# Patient Record
Sex: MALE | Race: BLACK OR AFRICAN AMERICAN | NOT HISPANIC OR LATINO | URBAN - METROPOLITAN AREA
[De-identification: names, ages, dates, MRNs, and addresses within clinical notes are randomized per-mention and may not be internally consistent; named-entity substitution may affect disease eponyms.]

---

## 2018-08-16 ENCOUNTER — OUTPATIENT (OUTPATIENT)
Dept: OUTPATIENT SERVICES | Facility: HOSPITAL | Age: 38
LOS: 1 days | End: 2018-08-16

## 2018-08-16 VITALS
TEMPERATURE: 98 F | HEIGHT: 70.5 IN | DIASTOLIC BLOOD PRESSURE: 84 MMHG | HEART RATE: 73 BPM | WEIGHT: 203.05 LBS | RESPIRATION RATE: 14 BRPM | SYSTOLIC BLOOD PRESSURE: 122 MMHG | OXYGEN SATURATION: 99 %

## 2018-08-16 DIAGNOSIS — N47.1 PHIMOSIS: ICD-10-CM

## 2018-08-16 DIAGNOSIS — Z98.890 OTHER SPECIFIED POSTPROCEDURAL STATES: Chronic | ICD-10-CM

## 2018-08-16 LAB
HCT VFR BLD CALC: 47 % — SIGNIFICANT CHANGE UP (ref 39–50)
HGB BLD-MCNC: 15.5 G/DL — SIGNIFICANT CHANGE UP (ref 13–17)
MCHC RBC-ENTMCNC: 29.5 PG — SIGNIFICANT CHANGE UP (ref 27–34)
MCHC RBC-ENTMCNC: 33 % — SIGNIFICANT CHANGE UP (ref 32–36)
MCV RBC AUTO: 89.5 FL — SIGNIFICANT CHANGE UP (ref 80–100)
NRBC # FLD: 0 — SIGNIFICANT CHANGE UP
PLATELET # BLD AUTO: 319 K/UL — SIGNIFICANT CHANGE UP (ref 150–400)
PMV BLD: 9.4 FL — SIGNIFICANT CHANGE UP (ref 7–13)
RBC # BLD: 5.25 M/UL — SIGNIFICANT CHANGE UP (ref 4.2–5.8)
RBC # FLD: 12.5 % — SIGNIFICANT CHANGE UP (ref 10.3–14.5)
WBC # BLD: 5.57 K/UL — SIGNIFICANT CHANGE UP (ref 3.8–10.5)
WBC # FLD AUTO: 5.57 K/UL — SIGNIFICANT CHANGE UP (ref 3.8–10.5)

## 2018-08-16 NOTE — H&P PST ADULT - RS GEN PE MLT RESP DETAILS PC
airway patent/breath sounds equal/no chest wall tenderness/no rhonchi/respirations non-labored/good air movement/no wheezes/clear to auscultation bilaterally/no intercostal retractions/no rales

## 2018-08-16 NOTE — H&P PST ADULT - NSANTHOSAYNRD_GEN_A_CORE
No. LORRI screening performed.  STOP BANG Legend: 0-2 = LOW Risk; 3-4 = INTERMEDIATE Risk; 5-8 = HIGH Risk

## 2018-08-16 NOTE — H&P PST ADULT - NEGATIVE CARDIOVASCULAR SYMPTOMS
no paroxysmal nocturnal dyspnea/no claudication/no chest pain/no orthopnea/no palpitations/no dyspnea on exertion

## 2018-08-16 NOTE — H&P PST ADULT - HISTORY OF PRESENT ILLNESS
39 y/o Black male presents with 39 y/o Black male presents to PST for pre op evaluation with pre op diagnosis phimosis in preparation for circumcision on 08/22/18

## 2018-08-16 NOTE — H&P PST ADULT - NEGATIVE GENERAL SYMPTOMS
no fever/no chills/no weight gain/no malaise/no fatigue/no sweating/no anorexia/no weight loss/no polyphagia no chills/no anorexia/no polyuria/no weight loss/no polyphagia/no fatigue/no weight gain/no sweating/no fever/no malaise

## 2018-08-16 NOTE — H&P PST ADULT - NEGATIVE BREAST SYMPTOMS
no nipple discharge R/no breast tenderness L/no breast tenderness R/no breast lump R/no nipple discharge L/no breast lump L

## 2018-08-16 NOTE — H&P PST ADULT - NEGATIVE SKIN SYMPTOMS
no itching/no brittle nails/no change in size/color of mole/no rash/no dryness/no tumor/no pitted nails/no hair loss

## 2018-08-16 NOTE — H&P PST ADULT - PAIN CHRONIC, PROFILE
Problem: Diabetes, Type 2 (Adult)  Goal: Signs and Symptoms of Listed Potential Problems Will be Absent, Minimized or Managed (Diabetes, Type 2)  Signs and symptoms of listed potential problems will be absent, minimized or managed by discharge/transition of care (reference Diabetes, Type 2 (Adult) CPG).   Outcome: Ongoing (interventions implemented as appropriate)  Bedtime CBG at 138.  No coverage noted.    Problem: Fall Risk (Adult)  Goal: Absence of Falls  Patient will demonstrate the desired outcomes by discharge/transition of care.   Outcome: Ongoing (interventions implemented as appropriate)  Patient is a fall risk due to BLE weakness.  Fall bracelet, socks and bed alarm noted in use.    Problem: Patient Care Overview  Goal: Plan of Care Review  Plan of care the same.  Patient not interested in reviewing it tonight.  No distress...just not interested.    Problem: Infection, Risk/Actual (Adult)  Goal: Infection Prevention/Resolution  Patient will demonstrate the desired outcomes by discharge/transition of care.   Outcome: Ongoing (interventions implemented as appropriate)  WBCs wnl.  No chills or temps noted.      Problem: Pressure Ulcer Risk (Johny Scale) (Adult,Obstetrics,Pediatric)  Goal: Skin Integrity  Patient will demonstrate the desired outcomes by discharge/transition of care.   Outcome: Ongoing (interventions implemented as appropriate)  No breakdown noted/observed.  BLEs with mild/trace edema but no break in skin.       no

## 2018-08-16 NOTE — H&P PST ADULT - NEGATIVE GENERAL GENITOURINARY SYMPTOMS
no hematuria/no renal colic/no flank pain R/no dysuria/no bladder infections/no urinary hesitancy/normal urinary frequency/no nocturia/no urine discoloration/no gas in urine/no incontinence/no flank pain L

## 2018-08-16 NOTE — H&P PST ADULT - PROBLEM SELECTOR PLAN 1
Scheduled for circumcision on 08/22/18. Pre op instructions, famotidine given and explained. Pt verbalized understanding.

## 2018-08-21 ENCOUNTER — TRANSCRIPTION ENCOUNTER (OUTPATIENT)
Age: 38
End: 2018-08-21

## 2018-08-22 ENCOUNTER — RESULT REVIEW (OUTPATIENT)
Age: 38
End: 2018-08-22

## 2018-08-22 ENCOUNTER — OUTPATIENT (OUTPATIENT)
Dept: OUTPATIENT SERVICES | Facility: HOSPITAL | Age: 38
LOS: 1 days | Discharge: HOME | End: 2018-08-22
Payer: COMMERCIAL

## 2018-08-22 VITALS
HEART RATE: 65 BPM | RESPIRATION RATE: 16 BRPM | SYSTOLIC BLOOD PRESSURE: 124 MMHG | WEIGHT: 203.05 LBS | TEMPERATURE: 99 F | OXYGEN SATURATION: 100 % | DIASTOLIC BLOOD PRESSURE: 76 MMHG | HEIGHT: 70.5 IN

## 2018-08-22 VITALS — OXYGEN SATURATION: 97 % | DIASTOLIC BLOOD PRESSURE: 79 MMHG | HEART RATE: 79 BPM | SYSTOLIC BLOOD PRESSURE: 122 MMHG

## 2018-08-22 DIAGNOSIS — N47.1 PHIMOSIS: ICD-10-CM

## 2018-08-22 DIAGNOSIS — Z98.890 OTHER SPECIFIED POSTPROCEDURAL STATES: Chronic | ICD-10-CM

## 2018-08-22 PROCEDURE — 88304 TISSUE EXAM BY PATHOLOGIST: CPT | Mod: 26

## 2018-08-22 NOTE — ASU DISCHARGE PLAN (ADULT/PEDIATRIC). - NOTIFY
Bleeding that does not stop/Fever greater than 101/Unable to Urinate/Pain not relieved by Medications/Persistent Nausea and Vomiting

## 2018-08-22 NOTE — ASU DISCHARGE PLAN (ADULT/PEDIATRIC). - MEDICATION SUMMARY - MEDICATIONS TO TAKE
I will START or STAY ON the medications listed below when I get home from the hospital:  None I will START or STAY ON the medications listed below when I get home from the hospital:    oxyCODONE-acetaminophen 5 mg-325 mg oral tablet  -- 1 tab(s) by mouth every 6 hours, As Needed MDD:6  -- Caution federal law prohibits the transfer of this drug to any person other  than the person for whom it was prescribed.  May cause drowsiness.  Alcohol may intensify this effect.  Use care when operating dangerous machinery.  This prescription cannot be refilled.  This product contains acetaminophen.  Do not use  with any other product containing acetaminophen to prevent possible liver damage.  Using more of this medication than prescribed may cause serious breathing problems.    -- Indication: For Pain

## 2018-08-24 LAB — SURGICAL PATHOLOGY STUDY: SIGNIFICANT CHANGE UP

## 2022-11-07 PROBLEM — N47.1 PHIMOSIS: Chronic | Status: ACTIVE | Noted: 2018-08-16

## 2022-11-08 PROBLEM — Z00.00 ENCOUNTER FOR PREVENTIVE HEALTH EXAMINATION: Status: ACTIVE | Noted: 2022-11-08

## 2022-11-09 ENCOUNTER — NON-APPOINTMENT (OUTPATIENT)
Age: 42
End: 2022-11-09

## 2022-11-09 ENCOUNTER — APPOINTMENT (OUTPATIENT)
Dept: ORTHOPEDIC SURGERY | Facility: CLINIC | Age: 42
End: 2022-11-09

## 2022-11-09 VITALS — WEIGHT: 196 LBS | HEIGHT: 71 IN | BODY MASS INDEX: 27.44 KG/M2

## 2022-11-09 DIAGNOSIS — Z78.9 OTHER SPECIFIED HEALTH STATUS: ICD-10-CM

## 2022-11-09 PROCEDURE — 99204 OFFICE O/P NEW MOD 45 MIN: CPT

## 2022-11-09 PROCEDURE — 71130 X-RAY STRENOCLAVIC JT 3/>VWS: CPT | Mod: LT

## 2022-11-09 PROCEDURE — 99072 ADDL SUPL MATRL&STAF TM PHE: CPT

## 2022-11-09 PROCEDURE — 73030 X-RAY EXAM OF SHOULDER: CPT | Mod: LT

## 2022-11-09 PROCEDURE — 72040 X-RAY EXAM NECK SPINE 2-3 VW: CPT

## 2022-11-09 NOTE — WORK
[Sprain/Strain] : sprain/strain [Was the competent medical cause of the injury] : was the competent medical cause of the injury [Are consistent with the injury] : are consistent with the injury [Consistent with my objective findings] : consistent with my objective findings [Total] : total [Does not reveal pre-existing condition(s) that may affect treatment/prognosis] : does not reveal pre-existing condition(s) that may affect treatment/prognosis [Cannot return to work because ________] : cannot return to work because [unfilled] [Unknown at this time] : : unknown at this time [No Rx restrictions] : No Rx restrictions. [I provided the services listed above] :  I provided the services listed above. [FreeTextEntry1] : good with treatment

## 2022-11-09 NOTE — HISTORY OF PRESENT ILLNESS
[Work related] : work related [9] : 9 [6] : 6 [Constant] : constant [] : yes [de-identified] : MERNA 11/4/22\par \par 42 year old RHD male with pain in the left shoulder/neck region. He was at work and was crushed between two large baggage carts, force was lateral to the shoulder and he felt immediate pain, could hardly breathe. He was take to [facility], underwent evaluation and instructed Xrays were negative, discharged home. He has been taking Ibuprofen and Robaxin without much help. Pain with motion left shoulder in the sternal region and up into the neck. No radicular complaints. No prior history of injury to the shoulder. He has not worked since, he works for [employer] [FreeTextEntry1] : left shoulder [FreeTextEntry3] : 11/422 [FreeTextEntry4] : 2:30pm [FreeTextEntry5] : Patient was moving bags and was pinned between two carts, injuring his shoulder. [FreeTextEntry6] : pulling [FreeTextEntry7] : neck, clavicle

## 2022-11-09 NOTE — DISCUSSION/SUMMARY
[de-identified] : Let this serve as a formal request for authorization for PT\par heat\par  lido patches\par HEP

## 2022-11-09 NOTE — PHYSICAL EXAM
[No bony abnormalities] : No bony abnormalities [Sitting] : sitting [Severe] : severe [Left] : left shoulder [There are no fractures, subluxations or dislocations. No significant abnormalities are seen] : There are no fractures, subluxations or dislocations. No significant abnormalities are seen [] : no prominent SC joint [FreeTextEntry3] : swelling near the AC joint, SC tenderness, no sternal tenderness. Some tenderness costochondral region. \par No crepitus, mild pain with deep breath. [TWNoteComboBox7] : active forward flexion 90 degrees [TWNoteComboBox6] : internal rotation sacrum [de-identified] : external rotation 60 degrees

## 2022-11-28 ENCOUNTER — NON-APPOINTMENT (OUTPATIENT)
Age: 42
End: 2022-11-28

## 2022-11-28 ENCOUNTER — APPOINTMENT (OUTPATIENT)
Dept: ORTHOPEDIC SURGERY | Facility: CLINIC | Age: 42
End: 2022-11-28

## 2022-11-28 VITALS — WEIGHT: 196 LBS | BODY MASS INDEX: 27.44 KG/M2 | HEIGHT: 71 IN

## 2022-11-28 PROCEDURE — 99214 OFFICE O/P EST MOD 30 MIN: CPT

## 2022-11-28 PROCEDURE — 99072 ADDL SUPL MATRL&STAF TM PHE: CPT

## 2022-11-28 NOTE — ASU DISCHARGE PLAN (ADULT/PEDIATRIC). - PATIENT BELONGING
patient's belongings returned
How Severe Is Your Cyst?: mild
Is This A New Presentation, Or A Follow-Up?: Cyst

## 2022-11-28 NOTE — REASON FOR VISIT
[FreeTextEntry2] : Patient is here to evaluate the Left Shoulder and L sided neck. Worker's Comp DOI: 11/4/22

## 2022-11-28 NOTE — WORK
[Total] : total [Does not reveal pre-existing condition(s) that may affect treatment/prognosis] : does not reveal pre-existing condition(s) that may affect treatment/prognosis [Cannot return to work because ________] : cannot return to work because [unfilled] [Unknown at this time] : : unknown at this time [No Rx restrictions] : No Rx restrictions. [I provided the services listed above] :  I provided the services listed above. [FreeTextEntry1] : good needs more pT and MRI shoulder

## 2022-11-28 NOTE — HISTORY OF PRESENT ILLNESS
[Dull/Aching] : dull/aching [Localized] : localized [Throbbing] : throbbing [Work related] : work related [9] : 9 [6] : 6 [Constant] : constant [] : yes [de-identified] : DOA 11/4/22\par \par  pain in the left shoulder/neck region after he  was crushed between two large baggage carts, force was lateral to the shoulder and he felt immediate pain, a little better in PT still with pain in certain motions, he is not working [FreeTextEntry1] : left shoulder [FreeTextEntry3] : 11/422 [FreeTextEntry4] : 2:30pm [FreeTextEntry5] : Patient was moving bags and was pinned between two carts, injuring his shoulder. [FreeTextEntry6] : pulling [FreeTextEntry7] : neck, clavicle [de-identified] : physical therapy

## 2022-11-28 NOTE — DISCUSSION/SUMMARY
[de-identified] : Let this serve as a formal request for comp authorization for PT and MRI L shoulder\par heat\par lido patches\par HEP

## 2022-11-28 NOTE — PHYSICAL EXAM
[Left] : left shoulder [Sitting] : sitting [Severe] : severe [] : no prominent SC joint [FreeTextEntry3] : swelling near the AC joint, SC tenderness [TWNoteComboBox7] : active forward flexion 135 degrees [TWNoteComboBox6] : internal rotation sacrum [de-identified] : external rotation 60 degrees

## 2022-12-01 ENCOUNTER — APPOINTMENT (OUTPATIENT)
Dept: MRI IMAGING | Facility: CLINIC | Age: 42
End: 2022-12-01

## 2022-12-08 ENCOUNTER — NON-APPOINTMENT (OUTPATIENT)
Age: 42
End: 2022-12-08

## 2022-12-08 ENCOUNTER — APPOINTMENT (OUTPATIENT)
Dept: ORTHOPEDIC SURGERY | Facility: CLINIC | Age: 42
End: 2022-12-08

## 2023-01-03 ENCOUNTER — NON-APPOINTMENT (OUTPATIENT)
Age: 43
End: 2023-01-03

## 2023-01-03 ENCOUNTER — APPOINTMENT (OUTPATIENT)
Dept: ORTHOPEDIC SURGERY | Facility: CLINIC | Age: 43
End: 2023-01-03
Payer: OTHER MISCELLANEOUS

## 2023-01-03 VITALS — HEIGHT: 71 IN | WEIGHT: 196 LBS | BODY MASS INDEX: 27.44 KG/M2

## 2023-01-03 DIAGNOSIS — S43.62XA SPRAIN OF LEFT STERNOCLAVICULAR JOINT, INITIAL ENCOUNTER: ICD-10-CM

## 2023-01-03 PROCEDURE — 99072 ADDL SUPL MATRL&STAF TM PHE: CPT

## 2023-01-03 PROCEDURE — 99214 OFFICE O/P EST MOD 30 MIN: CPT

## 2023-01-03 NOTE — HISTORY OF PRESENT ILLNESS
[Work related] : work related [Dull/Aching] : dull/aching [Localized] : localized [Throbbing] : throbbing [Not working due to injury] : Work status: not working due to injury [9] : 9 [6] : 6 [Constant] : constant [de-identified] : pain in the left shoulder/neck region after he  was crushed between two large baggage carts, force was lateral to the shoulder and he felt immediate pain, a little better in PT not recently allowed, he is not working [] : Post Surgical Visit: no [FreeTextEntry1] : left shoulder [FreeTextEntry3] : 11/422 [FreeTextEntry4] : 2:30pm [FreeTextEntry5] : Patient was moving bags and was pinned between two carts, injuring his shoulder. [FreeTextEntry6] : pulling [FreeTextEntry7] : neck, clavicle [de-identified] : physical therapy

## 2023-01-03 NOTE — PHYSICAL EXAM
[Left] : left shoulder [Sitting] : sitting [Severe] : severe [] : no prominent SC joint [FreeTextEntry3] : swelling near the AC joint, SC tenderness [TWNoteComboBox7] : active forward flexion 135 degrees [TWNoteComboBox6] : internal rotation sacrum [de-identified] : external rotation 60 degrees

## 2023-01-03 NOTE — WORK
[Total] : total [Does not reveal pre-existing condition(s) that may affect treatment/prognosis] : does not reveal pre-existing condition(s) that may affect treatment/prognosis [Cannot return to work because ________] : cannot return to work because [unfilled] [Unknown at this time] : : unknown at this time [No Rx restrictions] : No Rx restrictions. [I provided the services listed above] :  I provided the services listed above. [FreeTextEntry1] : good needs more PT

## 2023-01-03 NOTE — DISCUSSION/SUMMARY
[de-identified] : Let this serve as a formal request for comp authorization for PT \par heat\par lido patches\par HEP\par my unofficiaL READ OF mri IS PARTIAL TEAR rtc

## 2023-01-13 ENCOUNTER — FORM ENCOUNTER (OUTPATIENT)
Age: 43
End: 2023-01-13

## 2023-01-17 ENCOUNTER — FORM ENCOUNTER (OUTPATIENT)
Age: 43
End: 2023-01-17

## 2023-01-30 ENCOUNTER — APPOINTMENT (OUTPATIENT)
Dept: ORTHOPEDIC SURGERY | Facility: CLINIC | Age: 43
End: 2023-01-30
Payer: OTHER MISCELLANEOUS

## 2023-01-30 VITALS — HEIGHT: 71 IN | WEIGHT: 196 LBS | BODY MASS INDEX: 27.44 KG/M2

## 2023-01-30 DIAGNOSIS — S43.52XA SPRAIN OF LEFT ACROMIOCLAVICULAR JOINT, INITIAL ENCOUNTER: ICD-10-CM

## 2023-01-30 DIAGNOSIS — S13.9XXA SPRAIN OF JOINTS AND LIGAMENTS OF UNSPECIFIED PARTS OF NECK, INITIAL ENCOUNTER: ICD-10-CM

## 2023-01-30 PROCEDURE — 99072 ADDL SUPL MATRL&STAF TM PHE: CPT

## 2023-01-30 PROCEDURE — 99214 OFFICE O/P EST MOD 30 MIN: CPT

## 2023-01-30 NOTE — WORK
[Total] : total [Does not reveal pre-existing condition(s) that may affect treatment/prognosis] : does not reveal pre-existing condition(s) that may affect treatment/prognosis [Cannot return to work because ________] : cannot return to work because [unfilled] [Unknown at this time] : : unknown at this time [No Rx restrictions] : No Rx restrictions. [I provided the services listed above] :  I provided the services listed above. [FreeTextEntry1] : good needs more PT and spine eval and C spine MRI

## 2023-01-30 NOTE — PHYSICAL EXAM
[Left] : left shoulder [Sitting] : sitting [Severe] : severe [] : no prominent SC joint [TWNoteComboBox7] : active forward flexion 135 degrees [TWNoteComboBox6] : internal rotation sacrum [de-identified] : external rotation 60 degrees

## 2023-01-30 NOTE — RETURN TO WORK/SCHOOL
[Return Date: _____] : as of [unfilled].  This has been discussed in detail with ~Francia~ and ~he/she~ understands this. [Full Duty] : full duty

## 2023-01-30 NOTE — REASON FOR VISIT
[FreeTextEntry2] : Patient is here to evaluate the Left Shoulder and neck  from  Worker's Comp DOI: 11/4/22

## 2023-01-30 NOTE — HISTORY OF PRESENT ILLNESS
[Radiating] : radiating [Tingling] : tingling [Work related] : work related [9] : 9 [6] : 6 [Dull/Aching] : dull/aching [Localized] : localized [Throbbing] : throbbing [Constant] : constant [Not working due to injury] : Work status: not working due to injury [de-identified] : pain in the left shoulder/neck region after he  was crushed between two large baggage carts, force was lateral to the shoulder and he felt immediate pain,was a little better in PT, not working and still with paresthesias, using shoulder strap [] : Post Surgical Visit: no [FreeTextEntry1] : left shoulder [FreeTextEntry3] : 11/422 [FreeTextEntry4] : 2:30pm [FreeTextEntry5] : Patient was moving bags and was pinned between two carts, injuring his shoulder. [FreeTextEntry6] : pulling [FreeTextEntry7] : neck, clavicle [de-identified] : physical therapy

## 2023-02-15 ENCOUNTER — APPOINTMENT (OUTPATIENT)
Dept: ORTHOPEDIC SURGERY | Facility: CLINIC | Age: 43
End: 2023-02-15
Payer: OTHER MISCELLANEOUS

## 2023-02-15 VITALS — BODY MASS INDEX: 27.44 KG/M2 | WEIGHT: 196 LBS | HEIGHT: 71 IN

## 2023-02-15 DIAGNOSIS — M54.12 RADICULOPATHY, CERVICAL REGION: ICD-10-CM

## 2023-02-15 DIAGNOSIS — M62.838 OTHER MUSCLE SPASM: ICD-10-CM

## 2023-02-15 PROCEDURE — 99072 ADDL SUPL MATRL&STAF TM PHE: CPT

## 2023-02-15 PROCEDURE — 99215 OFFICE O/P EST HI 40 MIN: CPT

## 2023-02-15 RX ORDER — NAPROXEN 500 MG/1
500 TABLET ORAL
Qty: 60 | Refills: 0 | Status: ACTIVE | COMMUNITY
Start: 2023-02-15 | End: 1900-01-01

## 2023-02-15 NOTE — HISTORY OF PRESENT ILLNESS
[Neck] : neck [Sudden] : sudden [0] : 0 [Constant] : constant [Sleep] : sleep [Nothing helps with pain getting better] : Nothing helps with pain getting better [de-identified] : WC DOIE:  11/4/22. \par Details of Accident/Injury: Patient was moving bags and was pinned between two carts, injuring his neck and L shoulder. 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 \par  -At the C2-3 disc space level, disc herniation is noted\par deforming the thecal sac abutting the spinal cord. There is no evidence of\par neural foraminal stenosis. Preservation of disc space height and signal is\par noted.\par -At C3-4, disc herniation is noted deforming the thecal sac abutting the spinal\par cord. Right neural foraminal narrowing is noted in conjunction with facet and\par uncinate hypertrophic changes. There is no evidence of left neural foraminal\par stenosis. Loss of disc signal with preservation of disc space height.\par -At C4-5, disc herniation is noted deforming the thecal sac abutting the spinal\par cord. There is no evidence of neural foraminal stenosis. Loss of disc signal\par with preservation of disc space height.\par -At C5-6, disc herniation is noted deforming the thecal sac abutting the spinal\par cord. There is no evidence of neural foraminal stenosis. Loss of disc signal\par with preservation of disc space height with anterior disc extension.\par -At C6-7, disc herniation is noted deforming the thecal sac abutting the spinal\par cord. There is no evidence of neural foraminal stenosis. Loss of disc signal\par is noted with preservation of disc space height with anterior disc extension.\par -At C7-T1, disc bulge is noted deforming the thecal sac. There is no evidence\par of neural foraminal stenosis. Loss of disc signal is noted with loss of disc\par space height anteriorly\par Ind. 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 This can be seen with impingement.\par \par * Supraspinatus tendinopathy with 3 mm articular tear at the mid to posterior\par insertion.\par \par * Capsular thickening anteriorly, which can be seen with adhesive capsulitis.\par \par * Glenohumeral joint effusion\par \par -----------------------------------\par 2/15/23- RHDM. Neck pain radiating down the LUE to the digits with N/T after the above incident. Working full duty. No bb dysfunction\par  [] : no [FreeTextEntry3] : 11/04/2022 [FreeTextEntry7] : left arm [de-identified] : MRI C SPINE

## 2023-02-15 NOTE — IMAGING
[de-identified] : CSPINE\par Inspection: No rash or ecchymosis\par Palpation: No spasm in traps, rhomboids, paracervicals\par ROM: Full with stiffness\par Strength: 5/5 bilateral deltoid, biceps, triceps, wrist flexors, wrist extensors, , abductors\par Sensation: Sensation present to light touch bilateral C5-T1 distributions\par Reflexes: Negative Chandler's bilaterally \par \par L Shoulder-\par Palpation: Lateral tenderness to palpation\par ROM: Full with mild pain\par Strength: Decent strength with rotator cuff testing\par Provocative maneuvers: Positive impingement testing  [Straightening consistent with spasm] : Straightening consistent with spasm [Disc space narrowing] : Disc space narrowing

## 2023-02-15 NOTE — WORK
[Sprain/Strain] : sprain/strain [Other: ___] : [unfilled] [Was the competent medical cause of the injury] : was the competent medical cause of the injury [Are consistent with the injury] : are consistent with the injury [Partial] : partial [Does not reveal pre-existing condition(s) that may affect treatment/prognosis] : does not reveal pre-existing condition(s) that may affect treatment/prognosis [Can return to work without limitations on ______] : can return to work without limitations on [unfilled] [No Rx restrictions] : No Rx restrictions. [I provided the services listed above] :  I provided the services listed above.

## 2023-02-15 NOTE — ASSESSMENT
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  Advised to clear medication with internist or PCP if any concurrent health problem with heart, blood pressure, or GI system exists. \par

## 2023-02-21 ENCOUNTER — FORM ENCOUNTER (OUTPATIENT)
Age: 43
End: 2023-02-21

## 2023-03-06 ENCOUNTER — APPOINTMENT (OUTPATIENT)
Dept: ORTHOPEDIC SURGERY | Facility: CLINIC | Age: 43
End: 2023-03-06

## 2023-04-14 ENCOUNTER — APPOINTMENT (OUTPATIENT)
Dept: ORTHOPEDIC SURGERY | Facility: CLINIC | Age: 43
End: 2023-04-14

## 2024-04-29 ENCOUNTER — APPOINTMENT (OUTPATIENT)
Dept: ORTHOPEDIC SURGERY | Facility: CLINIC | Age: 44
End: 2024-04-29
Payer: OTHER MISCELLANEOUS

## 2024-04-29 VITALS — BODY MASS INDEX: 30.38 KG/M2 | HEIGHT: 71 IN | WEIGHT: 217 LBS

## 2024-04-29 DIAGNOSIS — S43.432D SUPERIOR GLENOID LABRUM LESION OF LEFT SHOULDER, SUBSEQUENT ENCOUNTER: ICD-10-CM

## 2024-04-29 PROCEDURE — 99455 WORK RELATED DISABILITY EXAM: CPT

## 2024-06-12 NOTE — DISCUSSION/SUMMARY
[de-identified] : Patient allowed to gently start resuming activities. Discussed change to medication prescription and usage. Bracing options discussed with patient. Activity modification as needed Discussed poss future surgery, pt deciding, questions answered, no guarantees,AS L shoulder labral repair try topical lidocaine reviewed current medications used by this patient

## 2024-06-12 NOTE — HISTORY OF PRESENT ILLNESS
[Work related] : work related [9] : 9 [6] : 6 [Dull/Aching] : dull/aching [Localized] : localized [Radiating] : radiating [Throbbing] : throbbing [Tingling] : tingling [Constant] : constant [Not working due to injury] : Work status: not working due to injury [de-identified] : pain in the left shoulder/neck region after he  was crushed between two large baggage carts, force was lateral to the shoulder and he felt immediate pain,was a little better in PT, not working and still with paresthesias, using shoulder strap [] : Post Surgical Visit: no [FreeTextEntry1] : left shoulder [FreeTextEntry3] : 11/422 [FreeTextEntry4] : 2:30pm [FreeTextEntry5] : Patient was moving bags and was pinned between two carts, injuring his shoulder. [FreeTextEntry6] : pulling [FreeTextEntry7] : neck, clavicle [de-identified] : physical therapy

## 2024-06-12 NOTE — PHYSICAL EXAM
[Left] : left shoulder [Sitting] : sitting [Severe] : severe [] : negative impingement testing [FreeTextEntry8] : mild [TWNoteComboBox7] : active forward flexion 165 degrees [TWNoteComboBox6] : internal rotation 35 degrees [de-identified] : external rotation 45 degrees

## 2024-06-12 NOTE — WORK
[Is there permanent partial impairment?] : Yes [Right] : right [Left] : left [FreeTextEntry7] : shoulder [FreeTextEntry8] : int rot 35 deg, ext rot 45 deg [de-identified] : full [FreeTextEntry5] : 15 [de-identified] : measured 3 times with gonio any neck issues for permnancy defer to dr owens

## 2024-12-28 ENCOUNTER — NON-APPOINTMENT (OUTPATIENT)
Age: 44
End: 2024-12-28

## 2025-06-18 NOTE — H&P PST ADULT - NSCAGESTDRUGCUTDN_GEN_A_CORE_SD
Caller: Zhane Blank    Relationship: Self    Best call back number: [phone]    Caller requesting test results: SELF    What test was performed: CT SCAN    When was the test performed: SUNDAY, 6/15/25    Where was the test performed: [facility]     Additional notes: STATED THAT THEY WERE CHECKING THE STATUS OF THE RESULTS AS THEY WERE TOLD THEY SHOULD BE BACK TUESDAY. PLEASE CALL AND ADVISE       
no